# Patient Record
Sex: MALE | Race: WHITE | NOT HISPANIC OR LATINO | Employment: FULL TIME | URBAN - METROPOLITAN AREA
[De-identification: names, ages, dates, MRNs, and addresses within clinical notes are randomized per-mention and may not be internally consistent; named-entity substitution may affect disease eponyms.]

---

## 2019-07-19 ENCOUNTER — HISTORICAL (OUTPATIENT)
Dept: ADMINISTRATIVE | Facility: HOSPITAL | Age: 39
End: 2019-07-19

## 2021-06-11 ENCOUNTER — HISTORICAL (OUTPATIENT)
Dept: ADMINISTRATIVE | Facility: HOSPITAL | Age: 41
End: 2021-06-11

## 2022-04-01 ENCOUNTER — HISTORICAL (OUTPATIENT)
Dept: ADMINISTRATIVE | Facility: HOSPITAL | Age: 42
End: 2022-04-01

## 2022-04-07 ENCOUNTER — HISTORICAL (OUTPATIENT)
Dept: ADMINISTRATIVE | Facility: HOSPITAL | Age: 42
End: 2022-04-07

## 2022-04-23 VITALS
HEIGHT: 64 IN | WEIGHT: 198 LBS | BODY MASS INDEX: 33.8 KG/M2 | SYSTOLIC BLOOD PRESSURE: 128 MMHG | DIASTOLIC BLOOD PRESSURE: 72 MMHG

## 2022-05-01 NOTE — HISTORICAL [facility]
This is a historical note converted from Zach. Formatting and pictures may have been removed.  Please reference Zach for original formatting and attached multimedia. Chief Complaint  RC LEFT ELBOW NOT BETTER  History of Present Illness  Patient was having some left elbow pain?it began with a cellulitis?and sprain.? Patient completed antibiotics,?and prednisone?with significant improvement?he was scheduled here for recheck?as just very concerned that is not 100% better and requesting an x-ray.? 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  Review of Systems  Constitutional:?No fever, no weakness, no weight loss, no fatigue  Cardiovascular:??No chest pain, no OCONNOR  Gastrointestinal:?No nausea, vomiting, or diarrhea. No abdominal pain  Musculoskeletal:?Pain to elbow as per above, otherwise?no muscle or joint pain, no joint swelling  Integumentary:??? Redness and swelling much improved, otherwise?no skin rash or abnormal lesion  ?  Physical Exam  Vitals & Measurements  T:?36.9? ?C (Oral)? HR:?60(Peripheral)? BP:?124/78?  HT:?162?cm? WT:?81.8?kg? BMI:?31.17?  General: ???? ? ? ? ? Well developed, well-nourished, in no acute distress  Cardiovascular:?Regular rate and rhythm without murmurs, gallops or rubs  Musculoskeletal:?Left upper extremity full active and passive range of motion, neurovascular intact,?no evidence of previous edema or erythema that was noted?on previous exams to left elbow. ?Otherwise?all extremities with?no tenderness, joints WNL, FROM, neg SLR, no CCE  Neuro: ? ? ? ? ? ? ? ? ?No motor/sensory deficits  Integumentary: ??No rashes or skin lesions  present  LN:?WNL  Assessment/Plan  1.?Sprain of left elbow?S53.402A  ?Resolved Cellulitis  Continue over-the-counter NSAIDs if needed, otherwise anticipate complete recovery?in the next few weeks.  Ordered:  Office/Outpatient Visit Level 3 Established [id number] PC, Sprain of left elbow, [facility], 07/19/19 12:21:00 CDT  ?  Orders:  Clinic Follow-up PRN, 07/19/19 12:21:00 CDT, [facility], Future Order  Referrals  Clinic Follow-up PRN, 07/19/19 12:21:00 CDT, [facility], Future Order   Problem List/Past Medical History  Ongoing  Anxiety  GERD (gastroesophageal reflux disease)  Obesity  Sprain of left elbow  Historical  No qualifying data  Procedure/Surgical History  Colonoscopy (11/24/2014)   Medications  busPIRone 10 mg oral tablet, 10 mg= 1 tab(s), Oral, BID, 5 refills  desvenlafaxine 50 mg oral tablet, extended release, 50 mg= 1 tab(s), Oral, Daily, 1 refills  Elavil 25 mg oral tablet, 25 mg= 1 tab(s), Oral, Once a day (at bedtime), 2 refills  Allergies  No Known Medication Allergies  Social History  Abuse/Neglect  No, 07/19/2019  Alcohol  Current, 1-2 times per month, 05/09/2019  Employment/School  Employed, 05/09/2019  Home/Environment  Lives with Children, Spouse., 05/09/2019  Nutrition/Health  Regular, 05/09/2019  Substance Use  Past, 05/09/2019  Tobacco  Never (less than 100 in lifetime), No, 07/19/2019  Former smoker, quit more than 30 days ago, Cigarettes, N/A, 10 per day. 7 year(s)., 05/09/2019  Family History  Hypertension.: Father.  Lung cancer: Grandmother.  Stroke: Father.  Immunizations  Vaccine Date Status   influenza virus vaccine, inactivated 02/20/2019 Recorded   pneumococcal 23-polyvalent vaccine 09/19/2017 Recorded   Health Maintenance  Health Maintenance  ???Pending?(in the next year)  ??? ??OverDue  ??? ? ? ?Alcohol Misuse Screening due??01/01/19??and every 1??year(s)  ??? ??Due?  ??? ? ? ?ADL Screening due??07/19/19??and every 1??year(s)  ??? ? ? ?Tetanus Vaccine due??07/19/19??and  every 10??year(s)  ??? ??Due In Future?  ??? ? ? ?Obesity Screening not due until??01/01/20??and every 1??year(s)  ??? ? ? ?Blood Pressure Screening not due until??07/18/20??and every 1??year(s)  ??? ? ? ?Body Mass Index Check not due until??07/18/20??and every 1??year(s)  ??? ? ? ?Depression Screening not due until??07/18/20??and every 1??year(s)  ???Satisfied?(in the past 1 year)  ??? ??Satisfied?  ??? ? ? ?Blood Pressure Screening on??07/19/19.??Satisfied by Sarah Eddy LPN  ??? ? ? ?Body Mass Index Check on??07/19/19.??Satisfied by Sarah Eddy LPN  ??? ? ? ?Depression Screening on??07/19/19.??Satisfied by Sarah Eddy LPN  ??? ? ? ?Influenza Vaccine on??02/20/19.??Satisfied by Sarah Eddy LPN  ??? ? ? ?Lipid Screening on??05/09/19.??Satisfied by Isabel Estrada  ??? ? ? ?Obesity Screening on??07/19/19.??Satisfied by Sarah Eddy LPN  ?

## 2022-05-01 NOTE — HISTORICAL [facility]
This is a historical note converted from Zach. Formatting and pictures may have been removed.  Please reference Zach for original formatting and attached multimedia. Chief Complaint  CPX FASTING / SX CLEARANCE DR. HAM SCOPE ON RT SHOULDER  History of Present Illness  40?year old WM presents for annual wellness/ surgical clearance.  Scheduled for right shoulder arthroscopy with Dr. KAREN Ham.  Pt is without acute complaints or concerns.  ?  ?   PMH: Anxiety, GERD  ?   , 1 child  Works for [employer]  No Tob, No EtOH  No exercise  ?   GI- Colonoscopy (2014)- Dr Shields  Cardiology- Dr. Florence (cardiac workup 1/20 neg)  Psych: Marge Estrada  Review of Systems  Constitutional:?no fever, no fatigue, no weakness  Psych: no anxiety,?no?depression, no insomnia  Eye:?no vision loss, no eye redness, no drainage, or pain  ENMT:?no sore throat, no ear pain, no sinus pain/congestion  Respiratory:?no cough, no wheezing, no shortness of breath  Cardiovascular:?no chest pain, no palpitations, no edema  Gastrointestinal:?no abdominal pain, no nausea, vomiting, diarrhea or constipation  Genitourinary:?no urinary frequency,? urgency, or incontinence, no dysuria, no hematuria  Hema/Lymph:?no abnormal bruising or bleeding  Endocrine:?no heat or cold intolerance, no excessive thirst or excessive urination  Musculoskeletal:?Right shoulder pain  Integumentary:?no skin rash or abnormal lesion  Neurologic: no headache, no dizziness, no weakness or numbness  ?  Physical Exam  Vitals & Measurements  T:?36.8? ?C (Oral)? HR:?68(Peripheral)? BP:?128/72?  HT:?162?cm? HT:?162.00?cm? WT:?89.8?kg? WT:?89.800?kg? BMI:?34.22?  General:?well-developed well-nourished in no acute distress  Eye: PERRLA, EOMI, clear conjunctiva, eyelids normal  HENT:?TMs/ear canals clear, oropharynx without erythema/exudate, oropharynx and nasal mucosal surfaces moist, no maxillary/frontal sinus tenderness to palpation  Neck: full range of motion, no  thyromegaly, no?lymphadenopathy, no carotid bruits  Respiratory:?respirations even and unlabored, clear to auscultation bilaterally  Cardiovascular:?regular rate and rhythm without murmurs, gallops or rubs  Gastrointestinal:?soft, non-tender, non-distended with normal bowel sounds, no palpable masses  Genitourinary: no CVA tenderness  Musculoskeletal:?full range of motion of all extremities/spine without limitation, + right shoulder discomfort  Integumentary: no rashes or skin lesions present  Neurologic: cranial nerves intact, no signs of peripheral neurological deficit, motor/sensory function intact  ?  Assessment/Plan  1.?Encounter for wellness examination?Z00.00  ?Fasting?labs completed (CBC, CMP, TSH, FLP); will call patient with results.?  ?  ?????Preventative: We discussed a healthy diet (healthy food choices, reduce portions and overall calorie intake), exercise?at least?30-45 minutes 3x per week,?Avoid excessive alcohol.??Immunizations and preventative exams discussed.  ?   Follow-up annually, sooner if needed.  Ordered:  CBC w/ Auto Diff, Routine collect, *Est. 06/11/21 3:00:00 CDT, Blood, Order for future visit, *Est. Stop date 06/11/21 3:00:00 CDT, Lab Collect, Encounter for wellness examination, 06/11/21 9:25:00 CDT  Clinic Follow-Up Wellness, *Est. 06/11/22 3:00:00 CDT, Order for future visit, Encounter for wellness examination  Preoperative clearance  Anxiety, [facility]  Comprehensive Metabolic Panel, Routine collect, *Est. 06/11/21 3:00:00 CDT, Blood, Order for future visit, *Est. Stop date 06/11/21 3:00:00 CDT, Lab Collect, Encounter for wellness examination, 06/11/21 9:25:00 CDT  Lab Collection Request, 06/11/21 9:25:00 CDT, [facility], 06/11/21 9:25:00 CDT, Encounter for wellness examination  Lipid Panel, Routine collect, *Est. 06/11/21 3:00:00 CDT, Blood, Order for future visit, *Est. Stop date 06/11/21 3:00:00 CDT, Lab Collect, Encounter for wellness examination, 06/11/21 9:25:00  CDT  Preventative Health Care Est 40-64 years [id number] PC, Encounter for wellness examination, [facility], 06/11/21 9:25:00 CDT  Thyroid Stimulating Hormone, Routine collect, *Est. 06/11/21 3:00:00 CDT, Blood, Order for future visit, *Est. Stop date 06/11/21 3:00:00 CDT, Lab Collect, Encounter for wellness examination, 06/11/21 9:25:00 CDT  ?  2.?Preoperative clearance?Z01.818  ?Patient medically cleared for surgery.  ?  EKG, CXR in office- will attach/ send with clearance forms.  Ordered:  Clinic Follow-Up Wellness, *Est. 06/11/22 3:00:00 CDT, Order for future visit, Encounter for wellness examination  Preoperative clearance  Anxiety, [facility]  Electrocardiogram, Complete [id number] PC, 06/11/21 9:26:00 CDT, [facility], 06/11/21 9:26:00 CDT, Preoperative clearance  XR Chest 2 Views, Routine, 06/11/21 9:26:00 CDT, Other (please specify), None, Ambulatory, Rad Type, Preoperative clearance, Not Scheduled, [facility], 06/11/21 9:26:00 CDT  ?  3.?Anxiety?F41.9  ?Continue venlafaxine?225 mg daily.  Follow-up with?psych as scheduled.  Ordered:  Clinic Follow-Up Wellness, *Est. 06/11/22 3:00:00 CDT, Order for future visit, Encounter for wellness examination  Preoperative clearance  Anxiety, [facility]  ?  Referrals  Clinic Follow-Up Wellness, *Est. 06/11/22 3:00:00 CDT, Order for future visit, Encounter for wellness examination  Preoperative clearance  Anxiety, [facility]   Problem List/Past Medical History  Ongoing  Acute back pain - lumbar  Anxiety  Bursitis of right shoulder  Cervical disc disorder  GERD (gastroesophageal reflux disease)  Impingement syndrome of right shoulder region  Obesity  Sprain of left elbow  Historical  No qualifying data  Procedure/Surgical History  Colonoscopy (11/24/2014)   Medications  propranolol 10 mg oral tablet, 10 mg= 1 tab(s), Oral, QID  venlafaxine 150 mg oral capsule, extended release, 150 mg= 1 cap(s), Oral, Daily  venlafaxine 75 mg oral capsule, extended  release, 75 mg= 1 cap(s), Oral, Daily  Allergies  doxycycline?(HTN)  Social History  Abuse/Neglect  No, 06/11/2021  No, 01/28/2020  Alcohol  Current, 1-2 times per month, 05/09/2019  Employment/School  Employed, 05/09/2019  Home/Environment  Lives with Children, Spouse., 05/09/2019  Nutrition/Health  Regular, 05/09/2019  Substance Use  Past, 05/09/2019  Tobacco  Former smoker, quit more than 30 days ago, N/A, 06/11/2021  Former smoker, quit more than 30 days ago, Cigarettes, N/A, 10 per day. 7 year(s)., 05/09/2019  Family History  CVA - Cerebrovascular accident: Father.  Hyperlipidemia.: Father.  Hypertension.: Father.  Lung cancer: Grandmother.  Stroke: Father.  Immunizations  Vaccine Date Status   influenza virus vaccine, inactivated 10/27/2019 Recorded   influenza virus vaccine, inactivated 02/20/2019 Recorded   pneumococcal 23-polyvalent vaccine 09/19/2017 Recorded   influenza virus vaccine, inactivated 09/19/2017 Recorded   influenza virus vaccine, inactivated 09/16/2014 Recorded   Health Maintenance  Health Maintenance  ???Pending?(in the next year)  ??? ??OverDue  ??? ? ? ?Influenza Vaccine due??10/01/20??and every 1??day(s)  ??? ? ? ?Alcohol Misuse Screening due??01/02/21??and every 1??year(s)  ??? ??Due?  ??? ? ? ?ADL Screening due??06/11/21??and every 1??year(s)  ??? ? ? ?Tetanus Vaccine due??06/11/21??and every 10??year(s)  ??? ??Due In Future?  ??? ? ? ?Obesity Screening not due until??01/01/22??and every 1??year(s)  ???Satisfied?(in the past 1 year)  ??? ??Satisfied?  ??? ? ? ?Blood Pressure Screening on??06/11/21.??Satisfied by Joyce Estrada CMA  ??? ? ? ?Body Mass Index Check on??06/11/21.??Satisfied by Joyce Estrada CMA  ??? ? ? ?Depression Screening on??06/11/21.??Satisfied by Joyce Estrada CMA  ??? ? ? ?Obesity Screening on??06/11/21.??Satisfied by Joyce Estrada CMA  ?      Patient condition discussed?in detail with nurse practitioner.? Agree with plan of care?and follow-up.

## 2022-06-16 PROBLEM — Z00.00 ANNUAL PHYSICAL EXAM: Status: ACTIVE | Noted: 2022-06-16

## 2022-06-16 PROBLEM — M75.41 IMPINGEMENT SYNDROME OF RIGHT SHOULDER: Status: ACTIVE | Noted: 2022-06-16

## 2022-06-16 PROBLEM — F41.1 GENERALIZED ANXIETY DISORDER: Status: ACTIVE | Noted: 2022-06-16

## 2022-06-16 PROBLEM — M50.90 DISORDER OF INTERVERTEBRAL DISC OF CERVICAL SPINE: Status: ACTIVE | Noted: 2021-04-07

## 2022-06-16 PROBLEM — E66.9 OBESITY: Status: ACTIVE | Noted: 2022-06-16

## 2022-06-16 PROBLEM — K21.9 GASTROESOPHAGEAL REFLUX DISEASE: Status: ACTIVE | Noted: 2022-06-16

## 2022-06-17 PROBLEM — E78.1 HYPERTRIGLYCERIDEMIA: Status: ACTIVE | Noted: 2022-06-17

## 2022-09-19 PROBLEM — Z00.00 ANNUAL PHYSICAL EXAM: Status: RESOLVED | Noted: 2022-06-16 | Resolved: 2022-09-19

## 2023-12-01 PROBLEM — E66.9 OBESITY: Status: RESOLVED | Noted: 2022-06-16 | Resolved: 2023-12-01

## 2023-12-01 PROBLEM — Q27.9: Status: ACTIVE | Noted: 2023-12-01

## 2023-12-01 PROBLEM — E66.3 OVERWEIGHT WITH BODY MASS INDEX (BMI) OF 27 TO 27.9 IN ADULT: Status: ACTIVE | Noted: 2023-12-01

## 2023-12-26 PROBLEM — F33.2 MAJOR DEPRESSIVE DISORDER, RECURRENT SEVERE WITHOUT PSYCHOTIC FEATURES: Status: ACTIVE | Noted: 2023-12-26

## 2023-12-26 PROBLEM — I27.20 PULMONARY HYPERTENSION, UNSPECIFIED: Status: ACTIVE | Noted: 2023-12-26

## 2024-03-04 PROBLEM — Z00.00 WELLNESS EXAMINATION: Status: RESOLVED | Noted: 2022-06-16 | Resolved: 2024-03-04
